# Patient Record
(demographics unavailable — no encounter records)

---

## 2025-05-19 NOTE — HISTORY OF PRESENT ILLNESS
[de-identified] : The patient comes to the office with no new complaints. He has no abdominal pain, nausea, or vomit. He has no noted changes in the hernias.  He admits that he is still smoking and his weight went up since his last visit in 2023.  He wishes to have the hernias repaired.

## 2025-05-19 NOTE — ASSESSMENT
[FreeTextEntry1] : The patient is an obese 32-year-old male with stable ventral and umbilical hernias.  The patient has again been advised to lose weight in preparation for eventual surgery.  The patient has been advised that weight loss will be an important adjunct to help potentially reduce the risks of infection, hernia recurrence, and chronic post operative pain associated with surgery by potentially reducing the tension along the abdominal wall.  The patient was also advised that he will need to be Nicotine free for at least 60 days prior to surgery. He will start by trying to stop smoking then work on weight loss.  He will follow up in 3 months or sooner should any problems arise. A total of 30 minutes was spent coordinating the patient's care.

## 2025-05-19 NOTE — PHYSICAL EXAM
[JVD] : no jugular venous distention  [Purpura] : no purpura  [Petechiae] : no petechiae [Skin Ulcer] : no ulcer [Skin Induration] : no induration [Alert] : alert [Oriented to Person] : oriented to person [Oriented to Place] : oriented to place [Oriented to Time] : oriented to time [Calm] : calm [de-identified] : non toxic, in no acute distress [de-identified] : NC/AT PERRL EOMI no scleral icterus  [de-identified] : trachea midline [de-identified] : no audible wheezing or stridor  [de-identified] : remains obese with no localizing tenderness, no guarding, no rebound, no masses  [de-identified] : FROM of all extremities with no gross deformity or angulation, there remains a reducible ventral and umbilcal hernia  [de-identified] : there is a new 1 cm lipoma superior and to the right of the ventral hernia  [de-identified] : mood is calm